# Patient Record
(demographics unavailable — no encounter records)

---

## 2024-10-30 NOTE — REASON FOR VISIT
[Initial Visit] : an initial visit for [Ingrown Nail] : ingrown nail [FreeTextEntry2] : right great toe

## 2024-10-30 NOTE — PHYSICAL EXAM
[General Appearance - Alert] : alert [General Appearance - In No Acute Distress] : in no acute distress [Ankle Swelling (On Exam)] : present [Ankle Swelling On The Right] : mild [Delayed in the Right Toes] : capillary refills normal in right toes [Delayed in the Left Toes] : capillary refills normal in the left toes [No Joint Swelling] : no joint swelling [] : normal strength/tone [Normal Foot/Ankle] : Both lower extremities were exposed and visualized. Standing exam demonstrates normal foot posture and alignment. Hindfoot exam shows no hindfoot valgus or varus [FreeTextEntry1] : Right hallux lateral nail border incurvation noted.  Edema and local erythema noted Right.   No drainage or purulence noted.  [Sensation] : the sensory exam was normal to light touch and pinprick [No Focal Deficits] : no focal deficits [Deep Tendon Reflexes (DTR)] : deep tendon reflexes were 2+ and symmetric [Motor Exam] : the motor exam was normal [Oriented To Time, Place, And Person] : oriented to person, place, and time [Impaired Insight] : insight and judgment were intact [Affect] : the affect was normal

## 2024-10-30 NOTE — HISTORY OF PRESENT ILLNESS
[Other: ____] : [unfilled] [FreeTextEntry1] : MICHAEL  is a 9 year old female present with ingrown toenail right great toe. Patient's mother states this problem has been present for about a month. She tried to get it out with no results. toe got red and swollen with pus drainage, patient was taken to her PCP who prescribed her PO antibiotics she completed. Mother states she has noticed improvement in pus drainage but wishes for treatment.  admits to biting her nail

## 2024-10-30 NOTE — ASSESSMENT
[FreeTextEntry1] : Discussed diagnosis and treatment with patient  Discussed etiology of symptoms patient is experiencing  Discussed all risks, complications and benefits for procedure  Consent signed, in chart: Right hallux lateral partial nail avulsion with phenol matrixectomy  Skin and nail fold prepped with alcohol and the Right 1st digit local anesthesia administered with 1% plain lidocaine 3 cc  Partial nail avulsion performed with sterile English Anvil and hemostat  Sterile curette used at the base of the nail matrix Nail border was then flushed with normal saline  Phenol applied to the nail matrix and flushed with alcohol/wound cleanser  Dressed with SSD, DSD, and coban. Discussed daily wound care.  Keep dressing clean, dry and intact for next 48 hours, then apply antibiotic ointment for 1 week, and dry bandage daily  Discussed all signs and symptoms of local infection. Patient instructed to return to the office as soon as signs and symptoms arise.   Patient to return to the office in 2 weeks

## 2024-11-13 NOTE — REASON FOR VISIT
[Ingrown Nail] : ingrown nail [Follow-Up Visit] : a follow-up visit for [FreeTextEntry2] : right great toe s/p PPNA

## 2024-11-13 NOTE — ASSESSMENT
[FreeTextEntry1] : Discussed diagnosis and treatment with patient  Discussed etiology of symptoms patient is experiencing  s/p Right hallux lateral partial nail avulsion with phenol matrixectomy 10/30/24. Healed Educated on proper nail trimming to prevent recurrence of ingrown Discussed all signs and symptoms of local infection. Patient instructed to return to the office as soon as signs and symptoms arise.  Patient to return to the office as needed

## 2024-11-13 NOTE — PHYSICAL EXAM
[General Appearance - Alert] : alert [General Appearance - In No Acute Distress] : in no acute distress [Ankle Swelling (On Exam)] : present [Ankle Swelling On The Right] : mild [No Joint Swelling] : no joint swelling [] : normal strength/tone [Normal Foot/Ankle] : Both lower extremities were exposed and visualized. Standing exam demonstrates normal foot posture and alignment. Hindfoot exam shows no hindfoot valgus or varus [Sensation] : the sensory exam was normal to light touch and pinprick [No Focal Deficits] : no focal deficits [Deep Tendon Reflexes (DTR)] : deep tendon reflexes were 2+ and symmetric [Motor Exam] : the motor exam was normal [Oriented To Time, Place, And Person] : oriented to person, place, and time [Impaired Insight] : insight and judgment were intact [Affect] : the affect was normal [Delayed in the Right Toes] : capillary refills normal in right toes [Delayed in the Left Toes] : capillary refills normal in the left toes [FreeTextEntry1] : Right hallux lateral nail border partially avulsed Resolved edema and local erythema noted Right.   No drainage or purulence noted.

## 2024-11-13 NOTE — HISTORY OF PRESENT ILLNESS
[Other: ____] : [unfilled] [FreeTextEntry1] : MICHAEL  is a 9 year old female present with ingrown toenail right great toe f/u s/p PPNA . Patient denies pain and states redness and swelling have resolved. Denies pain  admits to biting her nail interval update 11/13/24 denies acute pain. no concerns